# Patient Record
Sex: MALE | Race: WHITE | NOT HISPANIC OR LATINO | URBAN - METROPOLITAN AREA
[De-identification: names, ages, dates, MRNs, and addresses within clinical notes are randomized per-mention and may not be internally consistent; named-entity substitution may affect disease eponyms.]

---

## 2025-06-30 ENCOUNTER — OFFICE VISIT (OUTPATIENT)
Dept: URGENT CARE | Age: 45
End: 2025-06-30

## 2025-06-30 VITALS
RESPIRATION RATE: 18 BRPM | HEIGHT: 69 IN | OXYGEN SATURATION: 96 % | HEART RATE: 88 BPM | TEMPERATURE: 98.6 F | SYSTOLIC BLOOD PRESSURE: 156 MMHG | DIASTOLIC BLOOD PRESSURE: 102 MMHG | WEIGHT: 180 LBS | BODY MASS INDEX: 26.66 KG/M2
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
## 2025-06-30 RX ORDER — AMOXICILLIN AND CLAVULANATE POTASSIUM 875; 125 MG/1; MG/1
875 TABLET, FILM COATED ORAL 2 TIMES DAILY
Qty: 20 TABLET | Refills: 0 | Status: SHIPPED | OUTPATIENT
Start: 2025-06-30

## 2025-06-30 ASSESSMENT — PATIENT HEALTH QUESTIONNAIRE - PHQ9
1. LITTLE INTEREST OR PLEASURE IN DOING THINGS: NOT AT ALL
2. FEELING DOWN, DEPRESSED OR HOPELESS: NOT AT ALL
SUM OF ALL RESPONSES TO PHQ9 QUESTIONS 1 AND 2: 0

## 2025-06-30 ASSESSMENT — ENCOUNTER SYMPTOMS
CONSTITUTIONAL NEGATIVE: 1
WOUND: 1

## 2025-06-30 NOTE — PROGRESS NOTES
"Subjective   Patient ID: Jimmy Green is a 44 y.o. male. They present today with a chief complaint of Animal Bite (Scratched and bit by a cat yesterday on right hand and arm).    History of Present Illness  Pt presented with cat attack yesterday unsure if bite or just scratching. Today he noted some swelling and erythema of his thenar meinence  No recent tetnus      Animal Bite      Past Medical History  Allergies as of 06/30/2025    (No Known Allergies)       Prescriptions Prior to Admission[1]     Medical History[2]    Surgical History[3]     reports that he has been smoking cigarettes. He has never used smokeless tobacco.    Review of Systems  Review of Systems   Constitutional: Negative.    Skin:  Positive for wound.                                  Objective    Vitals:    06/30/25 1544   BP: (!) 156/102   BP Location: Left arm   Patient Position: Sitting   Pulse: 88   Resp: 18   Temp: 37 °C (98.6 °F)   SpO2: 96%   Weight: 81.6 kg (180 lb)   Height: 1.753 m (5' 9\")     No LMP for male patient.    Physical Exam  Constitutional:       Appearance: Normal appearance.   Musculoskeletal:      Comments: Neurovascular intact of hand   Skin:            Comments: Right hand with multiple scratches on inside of wrist  Area or erythema marked with swelling   Neurological:      Mental Status: He is alert.         Procedures    Point of Care Test & Imaging Results from this visit  No results found for this visit on 06/30/25.   Imaging  No results found.    Cardiology, Vascular, and Other Imaging  No other imaging results found for the past 2 days      Diagnostic study results (if any) were reviewed by Candis Cooley MD.    Assessment/Plan   Allergies, medications, history, and pertinent labs/EKGs/Imaging reviewed by Candis Cooley MD.     Medical Decision Making  Infection frim cat bite discussed what to watch for  Follow blood pressure as outpatient    Orders and Diagnoses  Diagnoses and all orders for this visit:  Cat " bite, initial encounter  -     amoxicillin-clavulanate (Augmentin) 875-125 mg tablet; Take 1 tablet (875 mg of amoxicillin) by mouth 2 times a day.  Other orders  -     Tdap vaccine, age 7 years and older  (BOOSTRIX)      Medical Admin Record      Patient disposition: Home    Electronically signed by Candis Cooley MD  4:00 PM           [1] (Not in a hospital admission)  [2] No past medical history on file.  [3] No past surgical history on file.